# Patient Record
Sex: MALE | Race: WHITE
[De-identification: names, ages, dates, MRNs, and addresses within clinical notes are randomized per-mention and may not be internally consistent; named-entity substitution may affect disease eponyms.]

---

## 2023-04-05 ENCOUNTER — HOSPITAL ENCOUNTER (EMERGENCY)
Dept: HOSPITAL 54 - ER | Age: 43
Discharge: LEFT BEFORE DISCHARGE | End: 2023-04-05
Payer: COMMERCIAL

## 2023-04-05 VITALS — SYSTOLIC BLOOD PRESSURE: 140 MMHG | DIASTOLIC BLOOD PRESSURE: 89 MMHG

## 2023-04-05 VITALS — WEIGHT: 211 LBS | BODY MASS INDEX: 30.21 KG/M2 | HEIGHT: 70 IN
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
## 2023-04-05 DIAGNOSIS — S62.651B: Primary | ICD-10-CM
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
## 2023-04-05 DIAGNOSIS — Z20.822: ICD-10-CM
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
## 2023-04-05 LAB
ALBUMIN SERPL BCP-MCNC: 4 G/DL (ref 3.4–5)
ALP SERPL-CCNC: 63 U/L (ref 46–116)
ALT SERPL W P-5'-P-CCNC: 34 U/L (ref 12–78)
AST SERPL W P-5'-P-CCNC: 25 U/L (ref 15–37)
BASOPHILS # BLD AUTO: 0 K/UL (ref 0–0.2)
BASOPHILS NFR BLD AUTO: 0.7 % (ref 0–2)
BILIRUB DIRECT SERPL-MCNC: 0.1 MG/DL (ref 0–0.2)
BILIRUB SERPL-MCNC: 0.7 MG/DL (ref 0.2–1)
BUN SERPL-MCNC: 15 MG/DL (ref 7–18)
CALCIUM SERPL-MCNC: 9.1 MG/DL (ref 8.5–10.1)
CHLORIDE SERPL-SCNC: 100 MMOL/L (ref 98–107)
CO2 SERPL-SCNC: 28 MMOL/L (ref 21–32)
CREAT SERPL-MCNC: 1.1 MG/DL (ref 0.6–1.3)
EOSINOPHIL NFR BLD AUTO: 2 % (ref 0–6)
GLUCOSE SERPL-MCNC: 138 MG/DL (ref 74–106)
HCT VFR BLD AUTO: 43 % (ref 39–51)
HGB BLD-MCNC: 14.9 G/DL (ref 13.5–17.5)
LYMPHOCYTES NFR BLD AUTO: 2.9 K/UL (ref 0.8–4.8)
LYMPHOCYTES NFR BLD AUTO: 45.6 % (ref 20–44)
MCHC RBC AUTO-ENTMCNC: 34 G/DL (ref 31–36)
MCV RBC AUTO: 90 FL (ref 80–96)
MONOCYTES NFR BLD AUTO: 0.5 K/UL (ref 0.1–1.3)
MONOCYTES NFR BLD AUTO: 7.4 % (ref 2–12)
NEUTROPHILS # BLD AUTO: 2.9 K/UL (ref 1.8–8.9)
NEUTROPHILS NFR BLD AUTO: 44.3 % (ref 43–81)
PLATELET # BLD AUTO: 220 K/UL (ref 150–450)
POTASSIUM SERPL-SCNC: 3.6 MMOL/L (ref 3.5–5.1)
PROT SERPL-MCNC: 7.5 G/DL (ref 6.4–8.2)
RBC # BLD AUTO: 4.84 MIL/UL (ref 4.5–6)
SODIUM SERPL-SCNC: 139 MMOL/L (ref 136–145)
WBC NRBC COR # BLD AUTO: 6.4 K/UL (ref 4.3–11)

## 2023-04-05 PROCEDURE — C9803 HOPD COVID-19 SPEC COLLECT: HCPCS

## 2023-04-05 PROCEDURE — A6403 STERILE GAUZE>16 <= 48 SQ IN: HCPCS

## 2023-04-05 PROCEDURE — 87426 SARSCOV CORONAVIRUS AG IA: CPT

## 2023-04-05 PROCEDURE — 99284 EMERGENCY DEPT VISIT MOD MDM: CPT

## 2023-04-05 PROCEDURE — 90715 TDAP VACCINE 7 YRS/> IM: CPT

## 2023-04-05 PROCEDURE — 85730 THROMBOPLASTIN TIME PARTIAL: CPT

## 2023-04-05 PROCEDURE — 36415 COLL VENOUS BLD VENIPUNCTURE: CPT

## 2023-04-05 PROCEDURE — 90471 IMMUNIZATION ADMIN: CPT

## 2023-04-05 PROCEDURE — 80048 BASIC METABOLIC PNL TOTAL CA: CPT

## 2023-04-05 PROCEDURE — 85025 COMPLETE CBC W/AUTO DIFF WBC: CPT

## 2023-04-05 PROCEDURE — 96365 THER/PROPH/DIAG IV INF INIT: CPT

## 2023-04-05 PROCEDURE — 73140 X-RAY EXAM OF FINGER(S): CPT

## 2023-04-05 PROCEDURE — 80076 HEPATIC FUNCTION PANEL: CPT

## 2023-04-05 NOTE — NUR
Patient does not wish to proceed with medical care recommended by Dr. GARDNER. 
Patient given information related to possible complications, up to and 
including death, which could occur as a result of leaving the hospital at this 
time. Patient verbalizes understanding of risks involved due to leaving against 
medical advice. Patient has signed AMA form.

## 2023-04-05 NOTE — NUR
CALLED [facility] [phone] DR. LOPEZ SPEAKING WITH DR. GARDNER.

## 2023-04-05 NOTE — NUR
[facility] CALLED FOR UPDATE REGARDING PT TRANSFER. INFORMED  THAT PT 
HAS NOT BEEN ACCEPTED AT A FACILITY YET.

## 2023-04-05 NOTE — NUR
[facility] CALLED FOR UPDATE REGARDING PT TRANSFER. INFORMED  
THAT PT HAS NOT BEEN ACCEPTED AT A FACILITY YET.